# Patient Record
Sex: FEMALE | Race: OTHER | Employment: OTHER | URBAN - METROPOLITAN AREA
[De-identification: names, ages, dates, MRNs, and addresses within clinical notes are randomized per-mention and may not be internally consistent; named-entity substitution may affect disease eponyms.]

---

## 2022-01-12 ENCOUNTER — NEW PATIENT (OUTPATIENT)
Dept: URBAN - METROPOLITAN AREA CLINIC 47 | Facility: CLINIC | Age: 77
End: 2022-01-12

## 2022-01-12 DIAGNOSIS — H25.813: ICD-10-CM

## 2022-01-12 DIAGNOSIS — H40.053: ICD-10-CM

## 2022-01-12 DIAGNOSIS — H52.7: ICD-10-CM

## 2022-01-12 DIAGNOSIS — H53.2: ICD-10-CM

## 2022-01-12 DIAGNOSIS — H40.023: ICD-10-CM

## 2022-01-12 PROCEDURE — 92004 COMPRE OPH EXAM NEW PT 1/>: CPT

## 2022-01-12 PROCEDURE — 92015 DETERMINE REFRACTIVE STATE: CPT

## 2022-01-12 ASSESSMENT — VISUAL ACUITY
OD_SC: 20/50-1
OU_CC: 20/25
OU_SC: J12
OS_CC: 20/40
OS_CC: J2
OU_CC: J1
OS_SC: J12
OU_SC: 20/50
OS_SC: 20/60
OD_CC: J2
OD_SC: J12
OD_CC: 20/25

## 2022-01-12 ASSESSMENT — TONOMETRY
OD_IOP_MMHG: 24
OS_IOP_MMHG: 24

## 2022-02-02 ENCOUNTER — FOLLOW UP (OUTPATIENT)
Dept: URBAN - METROPOLITAN AREA CLINIC 47 | Facility: CLINIC | Age: 77
End: 2022-02-02

## 2022-02-02 DIAGNOSIS — H40.023: ICD-10-CM

## 2022-02-02 DIAGNOSIS — H25.813: ICD-10-CM

## 2022-02-02 DIAGNOSIS — H40.053: ICD-10-CM

## 2022-02-02 PROCEDURE — 92012 INTRM OPH EXAM EST PATIENT: CPT

## 2022-02-02 PROCEDURE — 92133 CPTRZD OPH DX IMG PST SGM ON: CPT

## 2022-02-02 PROCEDURE — 76514 ECHO EXAM OF EYE THICKNESS: CPT

## 2022-02-02 ASSESSMENT — VISUAL ACUITY
OU_CC: 20/25
OD_SC: 20/80+2
OS_SC: 20/70-1
OS_CC: 20/30-1
OD_CC: 20/25
OU_SC: 20/50

## 2022-02-02 ASSESSMENT — TONOMETRY
OS_IOP_MMHG: 18
OD_IOP_MMHG: 18

## 2022-08-03 ENCOUNTER — FOLLOW UP (OUTPATIENT)
Dept: URBAN - METROPOLITAN AREA CLINIC 47 | Facility: CLINIC | Age: 77
End: 2022-08-03

## 2022-08-03 DIAGNOSIS — H40.023: ICD-10-CM

## 2022-08-03 DIAGNOSIS — H40.053: ICD-10-CM

## 2022-08-03 DIAGNOSIS — H25.813: ICD-10-CM

## 2022-08-03 PROCEDURE — 92250 FUNDUS PHOTOGRAPHY W/I&R: CPT

## 2022-08-03 PROCEDURE — 99213 OFFICE O/P EST LOW 20 MIN: CPT

## 2022-08-03 ASSESSMENT — TONOMETRY
OD_IOP_MMHG: 22
OS_IOP_MMHG: 22

## 2022-08-03 ASSESSMENT — VISUAL ACUITY
OD_CC: 20/20
OS_CC: 20/25

## 2022-09-01 ENCOUNTER — FOLLOW UP (OUTPATIENT)
Dept: URBAN - METROPOLITAN AREA CLINIC 47 | Facility: CLINIC | Age: 77
End: 2022-09-01

## 2022-09-01 DIAGNOSIS — H40.023: ICD-10-CM

## 2022-09-01 DIAGNOSIS — H25.813: ICD-10-CM

## 2022-09-01 DIAGNOSIS — H10.45: ICD-10-CM

## 2022-09-01 DIAGNOSIS — H40.053: ICD-10-CM

## 2022-09-01 PROCEDURE — 99213 OFFICE O/P EST LOW 20 MIN: CPT

## 2022-09-01 PROCEDURE — 92083 EXTENDED VISUAL FIELD XM: CPT

## 2022-09-01 RX ORDER — FLUOROMETHOLONE 2.5 MG/ML: 1 SUSPENSION/ DROPS OPHTHALMIC TWICE A DAY

## 2022-09-01 RX ORDER — LATANOPROST 50 UG/ML: 1 SOLUTION/ DROPS OPHTHALMIC EVERY EVENING

## 2022-09-01 ASSESSMENT — VISUAL ACUITY
OS_CC: 20/20-2
OD_CC: 20/25-1

## 2022-09-01 ASSESSMENT — TONOMETRY
OD_IOP_MMHG: 26
OS_IOP_MMHG: 28

## 2022-09-01 NOTE — PATIENT DISCUSSION
Recommend prophylactic treatment due to IOP increasing to 26/38. Render In Strict Bullet Format?: No Detail Level: Zone Continue Regimen: Doxycycline, benzoyl peroxide. Denies pregnancy or breast feeding

## 2022-11-15 ENCOUNTER — FOLLOW UP (OUTPATIENT)
Dept: URBAN - METROPOLITAN AREA CLINIC 47 | Facility: CLINIC | Age: 77
End: 2022-11-15

## 2022-11-15 DIAGNOSIS — H40.023: ICD-10-CM

## 2022-11-15 DIAGNOSIS — H25.813: ICD-10-CM

## 2022-11-15 PROCEDURE — 99213 OFFICE O/P EST LOW 20 MIN: CPT

## 2022-11-15 ASSESSMENT — VISUAL ACUITY
OS_CC: 20/40
OD_CC: 20/20

## 2022-11-15 ASSESSMENT — TONOMETRY
OS_IOP_MMHG: 16
OD_IOP_MMHG: 15

## 2022-11-15 NOTE — PATIENT DISCUSSION
Recommend prophylactic treatment due to IOP increasing to 26/38. IOP improved.  Cont. with latanoprost.

## 2023-01-19 ENCOUNTER — CONSULTATION/EVALUATION (OUTPATIENT)
Dept: URBAN - METROPOLITAN AREA CLINIC 44 | Facility: CLINIC | Age: 78
End: 2023-01-19

## 2023-01-19 DIAGNOSIS — L98.8: ICD-10-CM

## 2023-01-19 PROCEDURE — 99499NC CONSULT, COSMETIC NO CHARGE

## 2023-01-19 NOTE — PATIENT DISCUSSION
discussed laser, however pt is not a good candidate for sx. Irregular heart beat on blood thinner.  Pt to see Andrew Garcia for lines on the face, broken capillaries etc.

## 2023-01-30 ENCOUNTER — COMPREHENSIVE EXAM (OUTPATIENT)
Dept: URBAN - METROPOLITAN AREA CLINIC 47 | Facility: CLINIC | Age: 78
End: 2023-01-30

## 2023-01-30 DIAGNOSIS — H52.7: ICD-10-CM

## 2023-01-30 DIAGNOSIS — H43.811: ICD-10-CM

## 2023-01-30 DIAGNOSIS — H40.053: ICD-10-CM

## 2023-01-30 DIAGNOSIS — H40.023: ICD-10-CM

## 2023-01-30 DIAGNOSIS — H25.813: ICD-10-CM

## 2023-01-30 PROCEDURE — 92014 COMPRE OPH EXAM EST PT 1/>: CPT

## 2023-01-30 PROCEDURE — 92015 DETERMINE REFRACTIVE STATE: CPT

## 2023-01-30 ASSESSMENT — VISUAL ACUITY
OS_CC: J2
OD_CC: 20/25
OD_SC: 20/40-1
OD_SC: J12
OS_SC: 20/40-1
OS_SC: J12
OS_CC: 20/30-1
OD_CC: J3

## 2023-01-30 ASSESSMENT — TONOMETRY
OS_IOP_MMHG: 16
OD_IOP_MMHG: 16

## 2023-01-30 NOTE — PATIENT DISCUSSION
discussed laser, however pt is not a good candidate for sx. Irregular heart beat on blood thinner.  Pt to see Jayne Lakhani for lines on the face, broken capillaries etc.

## 2023-05-02 ENCOUNTER — FOLLOW UP (OUTPATIENT)
Dept: URBAN - METROPOLITAN AREA CLINIC 47 | Facility: CLINIC | Age: 78
End: 2023-05-02

## 2023-05-02 DIAGNOSIS — H40.023: ICD-10-CM

## 2023-05-02 DIAGNOSIS — H40.053: ICD-10-CM

## 2023-05-02 PROCEDURE — 99213 OFFICE O/P EST LOW 20 MIN: CPT

## 2023-05-02 PROCEDURE — 92020 GONIOSCOPY: CPT

## 2023-05-02 PROCEDURE — 92133 CPTRZD OPH DX IMG PST SGM ON: CPT

## 2023-05-02 ASSESSMENT — VISUAL ACUITY
OD_CC: 20/25
OS_CC: 20/40-2

## 2023-05-02 ASSESSMENT — TONOMETRY
OS_IOP_MMHG: 16
OD_IOP_MMHG: 16

## 2024-02-01 ENCOUNTER — COMPREHENSIVE EXAM (OUTPATIENT)
Dept: URBAN - METROPOLITAN AREA CLINIC 47 | Facility: CLINIC | Age: 79
End: 2024-02-01

## 2024-02-01 DIAGNOSIS — H01.026: ICD-10-CM

## 2024-02-01 DIAGNOSIS — H01.023: ICD-10-CM

## 2024-02-01 DIAGNOSIS — H40.053: ICD-10-CM

## 2024-02-01 DIAGNOSIS — H40.023: ICD-10-CM

## 2024-02-01 DIAGNOSIS — H25.813: ICD-10-CM

## 2024-02-01 DIAGNOSIS — H52.7: ICD-10-CM

## 2024-02-01 PROCEDURE — 92015 DETERMINE REFRACTIVE STATE: CPT

## 2024-02-01 PROCEDURE — 99214 OFFICE O/P EST MOD 30 MIN: CPT

## 2024-02-01 RX ORDER — FLUOROMETHOLONE ACETATE 1 MG/ML: 1 SUSPENSION/ DROPS OPHTHALMIC

## 2024-02-01 ASSESSMENT — VISUAL ACUITY
OS_CC: 20/40-1
OD_CC: J3
OS_CC: J2
OD_CC: 20/30-1
OU_CC: 20/30
OU_CC: J2

## 2024-02-01 ASSESSMENT — TONOMETRY
OD_IOP_MMHG: 14
OS_IOP_MMHG: 16

## 2024-06-13 ENCOUNTER — FOLLOW UP (OUTPATIENT)
Dept: URBAN - METROPOLITAN AREA CLINIC 47 | Facility: CLINIC | Age: 79
End: 2024-06-13

## 2024-06-13 DIAGNOSIS — H40.053: ICD-10-CM

## 2024-06-13 DIAGNOSIS — H40.023: ICD-10-CM

## 2024-06-13 DIAGNOSIS — H25.813: ICD-10-CM

## 2024-06-13 PROCEDURE — 99213 OFFICE O/P EST LOW 20 MIN: CPT

## 2024-06-13 PROCEDURE — 92133 CPTRZD OPH DX IMG PST SGM ON: CPT

## 2024-06-13 ASSESSMENT — TONOMETRY
OD_IOP_MMHG: 16
OS_IOP_MMHG: 18

## 2024-06-13 ASSESSMENT — VISUAL ACUITY
OD_CC: 20/50-1
OS_CC: 20/40-2

## 2024-10-22 ENCOUNTER — FOLLOW UP (OUTPATIENT)
Dept: URBAN - METROPOLITAN AREA CLINIC 47 | Facility: CLINIC | Age: 79
End: 2024-10-22

## 2024-10-22 DIAGNOSIS — H40.023: ICD-10-CM

## 2024-10-22 DIAGNOSIS — H40.053: ICD-10-CM

## 2024-10-22 DIAGNOSIS — H25.813: ICD-10-CM

## 2024-10-22 PROCEDURE — 99213 OFFICE O/P EST LOW 20 MIN: CPT

## 2025-02-04 ENCOUNTER — COMPREHENSIVE EXAM (OUTPATIENT)
Age: 80
End: 2025-02-04

## 2025-02-04 DIAGNOSIS — H25.813: ICD-10-CM

## 2025-02-04 DIAGNOSIS — H40.023: ICD-10-CM

## 2025-02-04 DIAGNOSIS — H40.053: ICD-10-CM

## 2025-02-04 PROCEDURE — 92250 FUNDUS PHOTOGRAPHY W/I&R: CPT

## 2025-02-04 PROCEDURE — 99214 OFFICE O/P EST MOD 30 MIN: CPT

## 2025-02-04 PROCEDURE — 92015 DETERMINE REFRACTIVE STATE: CPT

## 2025-06-05 ENCOUNTER — FOLLOW UP (OUTPATIENT)
Age: 80
End: 2025-06-05

## 2025-06-05 DIAGNOSIS — H01.00B: ICD-10-CM

## 2025-06-05 DIAGNOSIS — H40.023: ICD-10-CM

## 2025-06-05 DIAGNOSIS — H01.00A: ICD-10-CM

## 2025-06-05 DIAGNOSIS — H40.053: ICD-10-CM

## 2025-06-05 DIAGNOSIS — B88.0: ICD-10-CM

## 2025-06-05 PROCEDURE — 99214 OFFICE O/P EST MOD 30 MIN: CPT

## 2025-06-05 RX ORDER — LOTILANER OPHTHALMIC SOLUTION 2.5 MG/ML: 1 SOLUTION/ DROPS OPHTHALMIC TWICE A DAY
